# Patient Record
Sex: FEMALE | Race: WHITE | NOT HISPANIC OR LATINO | Employment: OTHER | URBAN - METROPOLITAN AREA
[De-identification: names, ages, dates, MRNs, and addresses within clinical notes are randomized per-mention and may not be internally consistent; named-entity substitution may affect disease eponyms.]

---

## 2017-10-05 ENCOUNTER — TELEPHONE (OUTPATIENT)
Dept: NEUROSURGERY | Facility: CLINIC | Age: 62
End: 2017-10-05

## 2017-10-05 NOTE — TELEPHONE ENCOUNTER
----- Message from Pat Brito sent at 10/5/2017  4:02 PM CDT -----  x 1st Request  _ 2nd Request  _ 3rd Request    Who: pt     Why: New patient requesting to schedule a appt with Dr. Briggs only. Please call and advise.     What Number to Call Back: [phone]     When to Expect a call back: (Before the end of the day)  -- if call after 3:00 call back will be tomorrow.

## 2017-10-09 ENCOUNTER — TELEPHONE (OUTPATIENT)
Dept: SPINE | Facility: CLINIC | Age: 62
End: 2017-10-09

## 2017-10-09 NOTE — TELEPHONE ENCOUNTER
Spoke with patient to inform her that she will need her primary care doctor to order an MRI before her visit with Dr. Briggs in [place]

## 2018-05-28 ENCOUNTER — HOSPITAL ENCOUNTER (EMERGENCY)
Facility: HOSPITAL | Age: 63
Discharge: HOME | End: 2018-05-28
Attending: EMERGENCY MEDICINE
Payer: MEDICARE

## 2018-05-28 VITALS
BODY MASS INDEX: 44.39 KG/M2 | RESPIRATION RATE: 18 BRPM | WEIGHT: 260 LBS | HEART RATE: 93 BPM | HEIGHT: 64 IN | TEMPERATURE: 98 F | SYSTOLIC BLOOD PRESSURE: 155 MMHG | OXYGEN SATURATION: 96 % | DIASTOLIC BLOOD PRESSURE: 82 MMHG

## 2018-05-28 DIAGNOSIS — L73.8 BACTERIAL FOLLICULITIS: Primary | ICD-10-CM

## 2018-05-28 PROCEDURE — 99282 EMERGENCY DEPT VISIT SF MDM: CPT

## 2018-05-28 RX ORDER — GABAPENTIN 600 MG/1
600 TABLET ORAL 3 TIMES DAILY
COMMUNITY

## 2018-05-28 RX ORDER — FUROSEMIDE 40 MG/1
40 TABLET ORAL 2 TIMES DAILY
COMMUNITY

## 2018-05-28 RX ORDER — SULFAMETHOXAZOLE AND TRIMETHOPRIM 800; 160 MG/1; MG/1
1 TABLET ORAL 2 TIMES DAILY
Qty: 20 TABLET | Refills: 0 | Status: SHIPPED | OUTPATIENT
Start: 2018-05-28 | End: 2018-05-28

## 2018-05-28 RX ORDER — SULFAMETHOXAZOLE AND TRIMETHOPRIM 800; 160 MG/1; MG/1
1 TABLET ORAL 2 TIMES DAILY
Qty: 20 TABLET | Refills: 0 | Status: SHIPPED | OUTPATIENT
Start: 2018-05-28 | End: 2018-06-07

## 2018-05-28 RX ORDER — LISINOPRIL 10 MG/1
10 TABLET ORAL DAILY
COMMUNITY

## 2018-05-28 NOTE — ED NOTES
"Two small, healing lesions to RUQ of abdomen since last week.  Reports it began as a "pimple" and then opened and began to burn after pouring peroxide on areas.  No drainage, fever, chills or other symptoms.    "

## 2018-05-28 NOTE — ED NOTES
"APPEARANCE: Alert, oriented and in no acute distress.  CARDIAC: Normal rate and rhythm, no murmur heard.   PERIPHERAL VASCULAR: peripheral pulses present. Normal cap refill. No edema. Warm to touch.    RESPIRATORY:Normal rate and effort, breath sounds clear bilaterally throughout chest. Respirations are equal and unlabored no obvious signs of distress.  GASTRO: soft, bowel sounds normal, no tenderness, no abdominal distention.  MUSC: Full ROM. No bony tenderness or soft tissue tenderness. No obvious deformity.  SKIN: WDL EX: Two small, healing lesions to RUQ of abdomen since last week.  Reports it began as a "pimple" and then opened and began to burn after pouring peroxide on areas.  No drainage  NEURO: 5/5 strength major flexors/extensors bilaterally. Sensory intact to light touch bilaterally. [place] coma scale: eyes open spontaneously-4, oriented & converses-5, obeys commands-6. No neurological abnormalities.   MENTAL STATUS: awake, alert and aware of environment.  EYE: PERRL, both eyes: pupils brisk and reactive to light. Normal size.  ENT: EARS: no obvious drainage. NOSE: no active bleeding.        "

## 2018-05-28 NOTE — ED PROVIDER NOTES
"Encounter Date: 5/28/2018       History     Chief Complaint   Patient presents with    Wound Check     c/o two wounds to R upper abdomen. "It started as a pimple and now its this. They just wont go away" denies any fever/chills, denies any drainage.      62-year-old white female with a history of hypertension and prior lumbar back surgery presents for 1 week of wounds to the right anterior upper abdomen.  Patient states the wound started as small pustules which ventrally open.  She has been treating it with Neosporin antibiotic ointment and poured peroxide on them.  They have not healed yet which is what she is concerned.  No systemic symptoms nausea vomiting diaphoresis fever or chills. No chest pain or shortness of breath no dizziness or weakness.          Review of patient's allergies indicates:  No Known Allergies  Past Medical History:   Diagnosis Date    Back pain     Hypertension      Past Surgical History:   Procedure Laterality Date    HERNIA REPAIR      HIP FRACTURE SURGERY Left      No family history on file.  Social History   Substance Use Topics    Smoking status: Never Smoker    Smokeless tobacco: Not on file    Alcohol use No     Review of Systems   Constitutional: Negative.    HENT: Negative.    Eyes: Negative.    Respiratory: Negative.    Cardiovascular: Negative.    Gastrointestinal: Negative.    Endocrine: Negative.    Genitourinary: Negative.    Musculoskeletal: Negative.    Skin:        2 open ulcers with circumferential erythema and dried ulcer-like base.  No zoster like pattern.  2 local lesions. No surrounding cellulitis.       Physical Exam     Initial Vitals [05/28/18 1316]   BP Pulse Resp Temp SpO2   (!) 155/82 93 18 98.3 °F (36.8 °C) 96 %      MAP       106.33         Physical Exam    ED Course   Procedures  Labs Reviewed - No data to display          Medical Decision Making:   Initial Assessment:   62-year-old white female presents with 1 week of nonhealing wounds to the right " upper abdomen.  He began as 2 small pustules.  The pustules eventually open and now believe 2 small erythematous ulcers.  Patient is treated with localized hydroxide as well as Neosporin.  Differential Diagnosis:   Staff coccal skin infection, cellulitis, herpes zoster, folliculitis  ED Management:  The patient is instructed to use Hibiclens will be given a prescription for Neosporin as well as Bactrim.                      Clinical Impression:Staph folliculitis   There were no encounter diagnoses.                           Huang Ponce MD  05/28/18 7103

## 2018-07-15 ENCOUNTER — HOSPITAL ENCOUNTER (EMERGENCY)
Facility: HOSPITAL | Age: 63
Discharge: HOME | End: 2018-07-15
Attending: EMERGENCY MEDICINE
Payer: MEDICARE

## 2018-07-15 VITALS
OXYGEN SATURATION: 98 % | SYSTOLIC BLOOD PRESSURE: 136 MMHG | HEART RATE: 97 BPM | DIASTOLIC BLOOD PRESSURE: 75 MMHG | RESPIRATION RATE: 16 BRPM | WEIGHT: 260 LBS | TEMPERATURE: 99 F | BODY MASS INDEX: 44.39 KG/M2 | HEIGHT: 64 IN

## 2018-07-15 DIAGNOSIS — M54.9 BACK PAIN, UNSPECIFIED BACK LOCATION, UNSPECIFIED BACK PAIN LATERALITY, UNSPECIFIED CHRONICITY: Primary | ICD-10-CM

## 2018-07-15 DIAGNOSIS — M54.2 NECK PAIN: ICD-10-CM

## 2018-07-15 DIAGNOSIS — R51.9 NONINTRACTABLE HEADACHE, UNSPECIFIED CHRONICITY PATTERN, UNSPECIFIED HEADACHE TYPE: ICD-10-CM

## 2018-07-15 PROCEDURE — 63600175 PHARM REV CODE 636 W HCPCS: Performed by: NURSE PRACTITIONER

## 2018-07-15 PROCEDURE — 25000003 PHARM REV CODE 250: Performed by: NURSE PRACTITIONER

## 2018-07-15 PROCEDURE — 99284 EMERGENCY DEPT VISIT MOD MDM: CPT | Mod: 25

## 2018-07-15 PROCEDURE — 96372 THER/PROPH/DIAG INJ SC/IM: CPT

## 2018-07-15 RX ORDER — KETOROLAC TROMETHAMINE 30 MG/ML
30 INJECTION, SOLUTION INTRAMUSCULAR; INTRAVENOUS
Status: COMPLETED | OUTPATIENT
Start: 2018-07-15 | End: 2018-07-15

## 2018-07-15 RX ORDER — ONDANSETRON 4 MG/1
4 TABLET, ORALLY DISINTEGRATING ORAL
Status: COMPLETED | OUTPATIENT
Start: 2018-07-15 | End: 2018-07-15

## 2018-07-15 RX ORDER — ACETAMINOPHEN 325 MG/1
650 TABLET ORAL
Status: COMPLETED | OUTPATIENT
Start: 2018-07-15 | End: 2018-07-15

## 2018-07-15 RX ADMIN — ONDANSETRON 4 MG: 4 TABLET, ORALLY DISINTEGRATING ORAL at 12:07

## 2018-07-15 RX ADMIN — KETOROLAC TROMETHAMINE 30 MG: 30 INJECTION, SOLUTION INTRAMUSCULAR at 01:07

## 2018-07-15 RX ADMIN — ACETAMINOPHEN 650 MG: 325 TABLET, FILM COATED ORAL at 12:07

## 2018-07-15 NOTE — ED PROVIDER NOTES
Encounter Date: 7/15/2018       History     Chief Complaint   Patient presents with    Back Pain     a shelf of powerades fell onto pt's head and back while she was shopping on Friday.      Patient is a 62-year-old female with past medical history of back pain and HTN who presents to ED with upper back pain x2 days. Patient reports that she was at [employer] on Friday, reaching for Powerade on a shelf when the shelf fell and about 8-10 Powerades fell on onto patient's head and neck. Patient denies LOC. Patient associates headache, dizziness, and nausea. Pt denies being on blood thinners. Patient reports taking Percocet around 5:00-6:00 this am with minor relief. Patient denies any exacerbating or alleviating factors.  Patient has fever, chills, neck stiffness, syncope, SOB, chest pain, V/D, abdominal pain, numbness, tingling, and dysuria.  Patient denies any other complaints at this time.      The history is provided by the patient.     Review of patient's allergies indicates:  No Known Allergies  Past Medical History:   Diagnosis Date    Back pain     Hypertension      Past Surgical History:   Procedure Laterality Date    BACK SURGERY      HERNIA REPAIR      HIP FRACTURE SURGERY Left      History reviewed. No pertinent family history.  Social History   Substance Use Topics    Smoking status: Never Smoker    Smokeless tobacco: Not on file    Alcohol use No     Review of Systems   Constitutional: Negative for chills and fever.   HENT: Negative for sore throat.    Respiratory: Negative for shortness of breath.    Cardiovascular: Negative for chest pain.   Gastrointestinal: Positive for nausea. Negative for abdominal pain, diarrhea and vomiting.   Genitourinary: Negative for dysuria.   Musculoskeletal: Positive for back pain and neck pain. Negative for neck stiffness.   Skin: Negative for rash.   Neurological: Positive for dizziness. Negative for syncope, facial asymmetry, speech difficulty, weakness,  light-headedness, numbness and headaches.   Hematological: Does not bruise/bleed easily.   Psychiatric/Behavioral: The patient is nervous/anxious.        Physical Exam     Initial Vitals [07/15/18 1201]   BP Pulse Resp Temp SpO2   (!) 180/88 104 (!) 24 98.8 °F (37.1 °C) 98 %      MAP       --         Physical Exam    Nursing note and vitals reviewed.  Constitutional: She is not diaphoretic. She is Obese . She is cooperative.  Non-toxic appearance. She does not have a sickly appearance.   HENT:   Head: Normocephalic.   Nose: Nose normal.   Mouth/Throat: Oropharynx is clear and moist.   Eyes: EOM and lids are normal. Pupils are equal, round, and reactive to light. Right eye exhibits normal extraocular motion and no nystagmus. Left eye exhibits normal extraocular motion and no nystagmus.   Neck: Trachea normal, normal range of motion, full passive range of motion without pain and phonation normal. Neck supple. Muscular tenderness present. No spinous process tenderness present. Normal range of motion present. No neck rigidity.   Cardiovascular: Regular rhythm and normal heart sounds. Tachycardia present.    Pt denies CP and SOB.   Pulmonary/Chest: Effort normal and breath sounds normal.   Abdominal: Soft. Normal appearance and bowel sounds are normal. There is no tenderness.   Musculoskeletal:        Cervical back: She exhibits tenderness and pain. She exhibits normal range of motion, no bony tenderness, no swelling, no edema, no deformity, no laceration, no spasm and normal pulse.        Back:    TTP to bilateral paraspinal muscles of cervical area. No bony tenderness of deformity. Full ROM.   Neurological: She is alert and oriented to person, place, and time. She has normal strength. No cranial nerve deficit or sensory deficit. Coordination and gait normal. GCS eye subscore is 4. GCS verbal subscore is 5. GCS motor subscore is 6.   Skin: Skin is warm, dry and intact. Capillary refill takes less than 2 seconds. No rash  noted.   Psychiatric: Her speech is normal and behavior is normal. Judgment normal. Her mood appears anxious. Cognition and memory are normal.         ED Course   Procedures  Labs Reviewed - No data to display       Imaging Results          CT Cervical Spine Without Contrast (Final result)  Result time 07/15/18 13:08:15    Final result by Tom Fuentes MD (07/15/18 13:08:15)                 Impression:      1. No acute displaced fracture or dislocation of the cervical spine noting limited evaluation secondary to habitus and motion.  Additional findings above.      Electronically signed by: Tom Fuentes MD  Date:    07/15/2018  Time:    13:08             Narrative:    EXAMINATION:  CT CERVICAL SPINE WITHOUT CONTRAST    CLINICAL HISTORY:  Neck pain, first study;    TECHNIQUE:  Low dose axial images, sagittal and coronal reformations were performed though the cervical spine.  Contrast was not administered.    COMPARISON:  None    FINDINGS:  Exam is limited by habitus and patient motion.    The visualized lung apices are grossly unremarkable noting minimal biapical scarring.  The thyroid gland and parotid glands and remaining salivary glands are grossly unremarkable.  There are a few nodular foci within the parotid glands bilaterally, suggesting intraglandular lymph nodes.  No significant tonsillar enlargement.  No significant cervical lymphadenopathy noting several scattered nonenlarged bilateral cervical chain lymph nodes.  The paraspinous and hypo pharyngeal soft tissues are grossly unremarkable.    Sagittal reformatted imaging demonstrates adequate alignment of the cervical spine without significant vertebral body height loss.  There is mild disc space height loss at C1-C2.  There is prominent anterior marginal osteophytosis involving C5-C6.  There is multilevel facet arthropathy.  No acute displaced fracture or dislocation of the cervical spine.  Evaluation for spondylitic changes is limited secondary to  patient motion.  There is disc osteophyte complex at C3-C4 resulting in mild spinal canal stenosis.  There is disc osteophyte complex at several additional levels also resulting in mild spinal canal stenosis, no severe spinal canal stenosis at any level.  There is moderate left neuroforaminal narrowing at C4-C5 as a result of disc osteophyte complex and facet arthropathy.                               CT Head Without Contrast (Final result)  Result time 07/15/18 13:01:47    Final result by Tom Fuentes MD (07/15/18 13:01:47)                 Impression:      1. No findings to suggest acute intraparenchymal traumatic injury.  2. Sequela of chronic microvascular ischemic change and senescent change.  3. Punctate focus of hypoattenuation within the anterior left caudate, and left subinsular regions, suggesting sequela of remote infarcts however in the absence of previous exams, remain age-indeterminate.  Correlation advised.      Electronically signed by: Tom Fuentes MD  Date:    07/15/2018  Time:    13:01             Narrative:    EXAMINATION:  CT HEAD WITHOUT CONTRAST    CLINICAL HISTORY:  Head trauma, minor, GCS>=13, NOC/NEXUS/CCR neg, first study;    TECHNIQUE:  Low dose axial images were obtained through the head.  Coronal and sagittal reformations were also performed. Contrast was not administered.    COMPARISON:  None.    FINDINGS:  There is generalized cerebral volume loss.  There is hypoattenuation in a periventricular fashion, likely sequela of chronic microvascular ischemic change.There is a punctate focus of hypoattenuation along the anterior aspect of the left thalamus, may reflect sequela of remote infarct.  There is a suspected prominent perivascular space within the left subinsular region versus sequela of remote infarct.  There is no evidence of acute major vascular territory infarct, hemorrhage, or mass.  There is no hydrocephalus.  There are no abnormal extra-axial fluid collections.  The  paranasal sinuses and mastoid air cells are clear, and there is no evidence of calvarial fracture.  The visualized soft tissues are unremarkable.                                 Medical Decision Making:   Initial Assessment:   Pt presents to ED with upper back pain x 2 days. Pt associates headache, dizziness, and nausea. Pt appears anxious and non-toxic. Pt afebrile. Cranial nerves intact. No neuro deficits. Motor and sensation intact.   Differential Diagnosis:   Sprain/strain, fracture, dislocation, intracranial bleeding   Clinical Tests:   Radiological Study: Ordered and Reviewed  ED Management:  CT head and neck, 4 mg PO zofran, 650 PO tylenol, 30 mg IM toradol  CT head and neck normal. The patient is stable will be DC home.  Patient instructed to take her prescribed Percocet as labeled as needed for her pain and to add ibuprofen or Motrin as labeled as needed.  Patient instructed follow up PCP within 2-3 days and to return to ED if symptoms worsen or change.                    ED Course as of Jul 15 1659   Sun Jul 15, 2018   1341 Attestation: Patient seen by me separately from the midlevel/resident. I agree with the history, physical and management of the patient.   The patient presents emergency department  [ST]      ED Course User Index  [ST] Anali Marsh MD     Clinical Impression:   The primary encounter diagnosis was Back pain, unspecified back location, unspecified back pain laterality, unspecified chronicity. Diagnoses of Neck pain and Nonintractable headache, unspecified chronicity pattern, unspecified headache type were also pertinent to this visit.                             Gideon Cadena NP  07/15/18 1343       Gideon Cadena NP  07/15/18 0719

## 2018-07-15 NOTE — DISCHARGE INSTRUCTIONS
Your CT scans were normal. Take your prescribed percocet as labeled and as needed for pain and you add ibuprofen or motrin as labeled and as needed. Follow-up with your PCP within 2-3 days and return to ED if symptoms worsen or change.